# Patient Record
Sex: MALE | Race: BLACK OR AFRICAN AMERICAN | NOT HISPANIC OR LATINO | Employment: PART TIME | URBAN - METROPOLITAN AREA
[De-identification: names, ages, dates, MRNs, and addresses within clinical notes are randomized per-mention and may not be internally consistent; named-entity substitution may affect disease eponyms.]

---

## 2021-05-25 ENCOUNTER — RECORDS - HEALTHEAST (OUTPATIENT)
Dept: ADMINISTRATIVE | Facility: CLINIC | Age: 56
End: 2021-05-25

## 2021-05-28 ENCOUNTER — RECORDS - HEALTHEAST (OUTPATIENT)
Dept: ADMINISTRATIVE | Facility: CLINIC | Age: 56
End: 2021-05-28

## 2023-05-30 ENCOUNTER — MEDICAL CORRESPONDENCE (OUTPATIENT)
Dept: HEALTH INFORMATION MANAGEMENT | Facility: CLINIC | Age: 58
End: 2023-05-30
Payer: COMMERCIAL

## 2023-05-31 ENCOUNTER — TRANSCRIBE ORDERS (OUTPATIENT)
Dept: OTHER | Age: 58
End: 2023-05-31

## 2023-05-31 DIAGNOSIS — R97.20 ELEVATED PSA: Primary | ICD-10-CM

## 2023-06-01 NOTE — TELEPHONE ENCOUNTER
MEDICAL RECORDS REQUEST   [facility]  Urology Clinic  [address] [place] [id number]  PHONE: [phone]  Fax: [phone]        FUTURE VISIT INFORMATION                                                       APPOINTMENT INFORMATION:    Date: 07/28/2023    Provider:  Nadir Holloway PA-C    Reason for Visit/Diagnosis: Elevated PSA    REFERRAL INFORMATION:    Referring provider:  Dr. Carri Hawkins at [facility] contact number: Ref phone: [phone] Ref fax: [phone]    RECORDS REQUESTED FOR VISIT                                                     NOTES  STATUS/DETAILS   OFFICE NOTE from referring provider  yes   OFFICE NOTE from other specialist  yes   MEDICATION LIST  yes   LABS     URINALYSIS (UA)  yes   PSA  yes     PRE-VISIT CHECKLIST      Joint diagnostic appointment coordinated correctly          (ensure right order & amount of time) Yes   RECORD COLLECTION COMPLETE Yes

## 2023-07-20 ENCOUNTER — PRE VISIT (OUTPATIENT)
Dept: UROLOGY | Facility: CLINIC | Age: 58
End: 2023-07-20
Payer: COMMERCIAL

## 2023-07-20 NOTE — TELEPHONE ENCOUNTER
Reason for visit: Consult    Relevant information: Elevated PSA referral     Records/imaging/labs/orders: Epic    At Rooming: carlos a Cárdenas  7/20/2023  9:26 AM

## 2023-07-20 NOTE — PROGRESS NOTES
Subjective     REFERRAL SOURCE  The patient is being seen in consultation at the request of [facility]  [facility]  [address] [place] [address]    REASON FOR CONSULT  Elevated PSA    HISTORY OF PRESENT ILLNESS  Mr. Donald is a pleasant 58 year old male who presents today for further evaluation and management of his elevated PSA. His past medical history is significant for GERD, thrombocytopenia, and chronic abdominal pain. I personally reviewed the primary care note from 5/25/23 in preparation for this visit.     Today:    Dysuria occasionally with certain foods and drinks    Baseline urinary symptoms of frequency     No pain with erections    No history of UTIs or prostatits     REVIEW OF SYSTEMS  Review of Systems   Constitutional: Negative for activity change and unexpected weight change.   HENT: Negative for ear pain and tinnitus.    Eyes: Negative for visual disturbance.   Respiratory: Negative for shortness of breath.    Cardiovascular: Negative for chest pain.   Gastrointestinal: Positive for constipation (Occasional). Negative for abdominal pain.   Genitourinary: Negative for hematuria.   Musculoskeletal: Negative for back pain.   Neurological: Negative for dizziness (Intermittent, not now) and light-headedness.   Hematological: Negative for adenopathy.   Psychiatric/Behavioral: Negative for sleep disturbance.     SOCIAL HISTORY  Denies any history of or current smoking     FAMILY HISTORY  Denies any known family history of urologic malignancy      RELEVANT SURGICAL HISTORY  Denies any history of abdominal surgeries     Objective     PHYSICAL EXAMINATION  General: Well-appearing male in no acute apparent distress.  Neuro: Alert and orientated x3.  Eyes: Anictric  Head: Normocephalic  Psych: Maintained eye contact throughout conversation.  Skin: No lesions in the areas examined.  Lungs: Normal respiratory effort upon inspiration.  Musculoskeletal: Normal gait noted with  ambulation.  Abdomen: Appears nondistended.  Rectal: Deferred, see plan    LABS  PSA Values:    5/11/23: 9.5 ng/mL  1/14/22: 6.41 ng/mL  7/9/19: 3.22 ng/mL    IMPRESSION  1. Elevated PSA    It was my pleasure meeting Mr. Donald in clinic today for discussion of his elevated PSA. We first discussed the etiologies of elevated PSA, including infection, inflammation, ejaculation prior to sampling, BPH, recent perineal trauma or catheterization, versus malignancy. We then discussed the natural history of prostate cancer and how it shapes our prostate cancer screening. Finally, we reviewed Mr. Donald current and previous PSA's and discussed that they have been increasing in a worrisome way that could repersent a prostate cancer. In light of this, we discussed that I believe now is the time to get a better piece of information in the form of the prostate MRI. We reviewed that while a repeat PSA or standard 12 core biopsy are both options, the MRI is a luda middle step that may spare him a biopsy or help ultrasound pursue a targeted biopsy.     After a detailed discussion during which we reviewed the risks and benefits of the above options, Mr. Donald elects to proceed with first available prostate MRI. Multiple questions were answered to his satisfaction. A business card was provided to the patient, and if he has any additional questions or concerns he is welcome to contact me.    PLAN    First available prostate MRI with office visit to discuss results     Signed by:    Nadir Holloway PA-C    I spent a total of 22 minutes spent on the date of the encounter doing chart review, history and exam, documentation, and further activities as noted above.

## 2023-07-21 ENCOUNTER — OFFICE VISIT (OUTPATIENT)
Dept: UROLOGY | Facility: CLINIC | Age: 58
End: 2023-07-21
Payer: COMMERCIAL

## 2023-07-21 VITALS — HEART RATE: 93 BPM | DIASTOLIC BLOOD PRESSURE: 81 MMHG | SYSTOLIC BLOOD PRESSURE: 131 MMHG

## 2023-07-21 DIAGNOSIS — R97.20 ELEVATED PROSTATE SPECIFIC ANTIGEN (PSA): Primary | ICD-10-CM

## 2023-07-21 PROCEDURE — 99204 OFFICE O/P NEW MOD 45 MIN: CPT | Performed by: STUDENT IN AN ORGANIZED HEALTH CARE EDUCATION/TRAINING PROGRAM

## 2023-07-21 RX ORDER — ERGOCALCIFEROL 1.25 MG/1
50000 CAPSULE ORAL
COMMUNITY
Start: 2022-01-17

## 2023-07-21 ASSESSMENT — ENCOUNTER SYMPTOMS
UNEXPECTED WEIGHT CHANGE: 0
ABDOMINAL PAIN: 0
BACK PAIN: 0
SLEEP DISTURBANCE: 0
HEMATURIA: 0
SHORTNESS OF BREATH: 0
CONSTIPATION: 1
ACTIVITY CHANGE: 0
DIZZINESS: 0
LIGHT-HEADEDNESS: 0
ADENOPATHY: 0

## 2023-07-21 ASSESSMENT — PAIN SCALES - GENERAL: PAINLEVEL: NO PAIN (0)

## 2023-07-21 NOTE — LETTER
7/21/2023       RE: Morris Donald  [address]  [address]  [place] [address]     Dear Colleague,    Thank you for referring your patient, Morris Donald, to the [facility]OLOGY [facility] [place] [facility]. Please see a copy of my visit note below.    Subjective     REFERRAL SOURCE  The patient is being seen in consultation at the request of Carri Hawkins  [facility]  [address] [place] [address]    REASON FOR CONSULT  Elevated PSA    HISTORY OF PRESENT ILLNESS  Mr. Donald is a pleasant 58 year old male who presents today for further evaluation and management of his elevated PSA. His past medical history is significant for GERD, thrombocytopenia, and chronic abdominal pain. I personally reviewed the primary care note from 5/25/23 in preparation for this visit.     Today:  Dysuria occasionally with certain foods and drinks  Baseline urinary symptoms of frequency   No pain with erections  No history of UTIs or prostatits     REVIEW OF SYSTEMS  Review of Systems   Constitutional: Negative for activity change and unexpected weight change.   HENT: Negative for ear pain and tinnitus.    Eyes: Negative for visual disturbance.   Respiratory: Negative for shortness of breath.    Cardiovascular: Negative for chest pain.   Gastrointestinal: Positive for constipation (Occasional). Negative for abdominal pain.   Genitourinary: Negative for hematuria.   Musculoskeletal: Negative for back pain.   Neurological: Negative for dizziness (Intermittent, not now) and light-headedness.   Hematological: Negative for adenopathy.   Psychiatric/Behavioral: Negative for sleep disturbance.     SOCIAL HISTORY  Denies any history of or current smoking     FAMILY HISTORY  Denies any known family history of urologic malignancy      RELEVANT SURGICAL HISTORY  Denies any history of abdominal surgeries     Objective     PHYSICAL  EXAMINATION  General: Well-appearing male in no acute apparent distress.  Neuro: Alert and orientated x3.  Eyes: Anictric  Head: Normocephalic  Psych: Maintained eye contact throughout conversation.  Skin: No lesions in the areas examined.  Lungs: Normal respiratory effort upon inspiration.  Musculoskeletal: Normal gait noted with ambulation.  Abdomen: Appears nondistended.  Rectal: Deferred, see plan    LABS  PSA Values:    5/11/23: 9.5 ng/mL  1/14/22: 6.41 ng/mL  7/9/19: 3.22 ng/mL    IMPRESSION  Elevated PSA    It was my pleasure meeting Mr. Donald in clinic today for discussion of his elevated PSA. We first discussed the etiologies of elevated PSA, including infection, inflammation, ejaculation prior to sampling, BPH, recent perineal trauma or catheterization, versus malignancy. We then discussed the natural history of prostate cancer and how it shapes our prostate cancer screening. Finally, we reviewed Mr. Donald current and previous PSA's and discussed that they have been increasing in a worrisome way that could repersent a prostate cancer. In light of this, we discussed that I believe now is the time to get a better piece of information in the form of the prostate MRI. We reviewed that while a repeat PSA or standard 12 core biopsy are both options, the MRI is a luda middle step that may spare him a biopsy or help ultrasound pursue a targeted biopsy.     After a detailed discussion during which we reviewed the risks and benefits of the above options, Mr. Donald elects to proceed with first available prostate MRI. Multiple questions were answered to his satisfaction. A business card was provided to the patient, and if he has any additional questions or concerns he is welcome to contact me.    PLAN  First available prostate MRI with office visit to discuss results     Signed by:    Nadir Holloway PA-C    I spent a total of 22 minutes spent on the date of the encounter doing chart review, history and exam,  documentation, and further activities as noted above.

## 2023-07-21 NOTE — NURSING NOTE
Chief Complaint   Patient presents with     Consult     Elevated PSA and urinary urgency        Blood pressure 131/81, pulse 93. There is no height or weight on file to calculate BMI.    There is no problem list on file for this patient.      No Known Allergies    Current Outpatient Medications   Medication Sig Dispense Refill     ergocalciferol (ERGOCALCIFEROL) 1.25 MG (23464 UT) capsule Take 50,000 Units by mouth       omeprazole (PRILOSEC) 20 MG DR capsule Take 20 mg by mouth         Social History     Tobacco Use     Smoking status: Never     Smokeless tobacco: Never       Reba Mendiola, [facility]  7/21/2023  1:23 PM

## 2023-07-28 ENCOUNTER — PRE VISIT (OUTPATIENT)
Dept: UROLOGY | Facility: CLINIC | Age: 58
End: 2023-07-28

## 2023-08-27 ENCOUNTER — ANCILLARY PROCEDURE (OUTPATIENT)
Dept: MRI IMAGING | Facility: CLINIC | Age: 58
End: 2023-08-27
Attending: STUDENT IN AN ORGANIZED HEALTH CARE EDUCATION/TRAINING PROGRAM
Payer: COMMERCIAL

## 2023-08-27 DIAGNOSIS — R97.20 ELEVATED PROSTATE SPECIFIC ANTIGEN (PSA): ICD-10-CM

## 2023-08-27 PROCEDURE — 72197 MRI PELVIS W/O & W/DYE: CPT | Performed by: RADIOLOGY

## 2023-08-27 PROCEDURE — A9585 GADOBUTROL INJECTION: HCPCS | Performed by: RADIOLOGY

## 2023-08-27 RX ORDER — GADOBUTROL 604.72 MG/ML
7.5 INJECTION INTRAVENOUS ONCE
Status: COMPLETED | OUTPATIENT
Start: 2023-08-27 | End: 2023-08-27

## 2023-08-27 RX ADMIN — GADOBUTROL 7.5 ML: 604.72 INJECTION INTRAVENOUS at 13:10

## 2023-08-27 NOTE — DISCHARGE INSTRUCTIONS

## 2023-09-08 ENCOUNTER — TELEPHONE (OUTPATIENT)
Dept: UROLOGY | Facility: CLINIC | Age: 58
End: 2023-09-08

## 2023-09-08 ENCOUNTER — PRE VISIT (OUTPATIENT)
Dept: UROLOGY | Facility: CLINIC | Age: 58
End: 2023-09-08

## 2023-09-08 NOTE — TELEPHONE ENCOUNTER
Reason for visit: Return: Elevated PSA     Relevant information: LOV: 7;21/23  POC:First available prostate MRI with office visit to discuss results      Records/imaging/labs/orders: 8/27/23 MR Prostate - Available in Epic    Pt called: No need for a call    At Rooming: no special needs    Deann Spicer LPN  9/8/2023  8:13 AM

## 2023-09-22 ENCOUNTER — PRE VISIT (OUTPATIENT)
Dept: UROLOGY | Facility: CLINIC | Age: 58
End: 2023-09-22
Payer: COMMERCIAL

## 2023-09-22 NOTE — TELEPHONE ENCOUNTER
Reason for visit: Follow up      Relevant information: Test results     Records/imaging/labs/orders: MRI in [facility]     At Rooming: Blair Cárdenas  9/22/2023  9:50 AM

## 2023-09-28 NOTE — PROGRESS NOTES
Subjective   REASON FOR VISIT  Elevated PSA follow-up     HISTORY OF PRESENT ILLNESS  Mr. Donald is a pleasant 58 year old male who I am speaking with today in follow-up for his elevated PSA.  I first met him on 7/21/2023, at which time we discussed that his PSA trend was somewhat worrisome, and ultimately we recommended first available prostate MRI.  This was completed on 8/27/2023 and he returns today to discuss results.     Objective   PHYSICAL EXAMINATION  General: Alert, oriented, no acute distress     LABS  PSA Values:     5/11/23: 9.5 ng/mL  1/14/22: 6.41 ng/mL  7/9/19: 3.22 ng/mL     IMAGING  I personally reviewed the below prostate MRI from 8/27/2023     Narrative & Impression   MRI PROSTATE: 8/27/2023 2:08 PM     CLINICAL HISTORY: Elevated prostate specific antigen (PSA)     Most Recent PSA: 9.5 ug/L (5/11/23)     Comparison: None.     TECHNIQUE:  The following sequences were obtained: High-resolution axial  T2-weighted, coronal T2-weighted, 3D volumetric T2-weighted, axial  pre-contrast T1, axial diffusion-weighted, axial apparent diffusion  coefficient and axial dynamic contrast-enhanced T1. Postcontrast  images were evaluated on a separate workstation to evaluate dynamic  contrast enhancement. The technique of this exam is PI-RADS v2.1  compliant. Contrast dose: 7.5mL Gadavist     FINDINGS:  Size: 45 grams  Hemorrhage: Absent  Peripheral zone: Heterogeneous on T2-weighted images. Regions of  mildly decreased signal on ADC or DWI which are best characterized as  PI-RADS 2 without highly suspicious lesion.  Transition zone: Enlarged with BPH changes. Suspicious lesions as  detailed below.     Lesion(s) in rank order of severity (highest score- to lowest score,  then by size)      Lesion 1:  Location: Right mid gland transition zone at the 8-9 o'clock position  relative to the urethra. Series 7 image 40.   Additional prostate regions involved: None  Size: 12 mm  T2 description: Partially encapsulated  moderately T2 hypointense  nodule  T2 numerical assessment: 2  DWI description: Moderately hyperintense on DWI and hypointense on  ADC.  DWI numerical assessment: 4  DCE assessment: Positive    Prostate margin: Capsular abutment<6 mm  Lesion overall PI-RADS category: 3     Lesion 2:  Location: Right apex transition zone at the 9-10 o'clock position  relative to the urethra. Series 7 image 62.   Additional prostate regions involved: None  Size: 9 mm  T2 description: Heterogenous signal intensity with ill-defined  margins.  T2 numerical assessment: 3  DWI description: Moderately hyperintense on DWI and hypointense on ADC  DWI numerical assessment: 4  DCE assessment: Positive    Prostate margin: No capsular abutment  Lesion overall PI-RADS category: 3     Lesion 3:  Location: Left mid gland transition zone at the 4 o'clock position  relative to the urethra. Series 7 image 46.   Additional prostate regions involved: None  Size: 8 mm  T2 description: Heterogenous signal intensity with obscured margins  T2 numerical assessment: 3  DWI description: Moderately hypointensity on ADC and hyperintensity on  DWI   DWI numerical assessment: 4  DCE assessment: Positive    Prostate margin: No capsular abutment  Lesion overall PI-RADS category: 3     Neurovascular bundles: No neurovascular bundle involvement by  malignancy.  Seminal vesicles: No seminal vesicle involvement by malignancy.  Lymph nodes: No lymph node involvement. Multiple prominent bilateral  inguinal lymph nodes. Subcentimeter prominent right external iliac  chain lymph node, which is indeterminate (series 20 image 47).  Bones: No suspicious lesions  Other pelvic organs: Diffuse bladder wall thickening with  trabeculations, likely related to chronic bladder outlet obstruction.  Trace right fat-containing inguinal hernia.                                                                         IMPRESSION:  1. Based on the most suspicious abnormality, this exam  is  characterized as PIRADS 3 - The presence of clinically significant  cancer is equivocal.  The most suspicious abnormality is located at  the right mid gland, right apex and left mid gland transition zones  and there is minimal capsular abutment with no convincing evidence of  extraprostatic extension.  2. No suspicious adenopathy or evidence of pelvic metastases.        PIRADS? v2.1 Assessment Categories   PIRADS 1: Very low (clinically significant cancer is highly unlikely  to be present)   PIRADS 2: Low (clinically significant cancer is unlikely to be  present)   PIRADS 3: Intermediate (the presence of clinically significant cancer  is equivocal)   PIRADS 4: High (clinically significant cancer is likely to be present)     PIRADS 5: Very high (clinically significant cancer is highly likely to  be present)     I have personally reviewed the examination and initial interpretation  and I agree with the findings.     TELMA THURMAN MD       Assessment   Elevated PSA with three PI-RADS 3 lesions on prostate MRI     It was my pleasure to meet with Mr. Donald in follow-up for his elevated PSA.  After reviewing his clinical situation as well as the different possible results the prostate MRI could show us, I informed him that his MRI showed 3 separate PI-RADS 3 lesions in separate areas of the prostate.  This paired with his elevating PSA leads us to strongly recommend he go forward with a first available prostate biopsy.  We walked through this procedure in detail, including the risks and benefits, and also discussed the prework he would have to do including an antibiotic course of 3 days that he would start the day before the biopsy and an enema that he would complete 2 hours prior to his biopsy.  After reviewing this information, I expressed understanding agreement moving forward with this biopsy and week with the surgeon 1 week afterwards to discuss results.  Of note, he stated that he understood the majority  of our conversation very well but is requesting Mr. Donald he have an  be used when he is called in regards to the antibiotic course.    Mr. Donald expressed understanding and agreement to the above discussion and plan and all of his questions were answered to his satisfaction.       PLAN  First available prostate biopsy with office visit with same surgeon one week later to go through results     SIGNED:     Nadir Holloway PA-C

## 2023-09-29 ENCOUNTER — OFFICE VISIT (OUTPATIENT)
Dept: UROLOGY | Facility: CLINIC | Age: 58
End: 2023-09-29
Payer: COMMERCIAL

## 2023-09-29 VITALS
HEART RATE: 67 BPM | DIASTOLIC BLOOD PRESSURE: 82 MMHG | HEIGHT: 78 IN | SYSTOLIC BLOOD PRESSURE: 149 MMHG | WEIGHT: 136 LBS | BODY MASS INDEX: 15.74 KG/M2

## 2023-09-29 DIAGNOSIS — R97.20 ELEVATED PROSTATE SPECIFIC ANTIGEN (PSA): Primary | ICD-10-CM

## 2023-09-29 PROCEDURE — 99213 OFFICE O/P EST LOW 20 MIN: CPT | Performed by: STUDENT IN AN ORGANIZED HEALTH CARE EDUCATION/TRAINING PROGRAM

## 2023-09-29 ASSESSMENT — PAIN SCALES - GENERAL: PAINLEVEL: NO PAIN (0)

## 2023-09-29 NOTE — NURSING NOTE
"Chief Complaint   Patient presents with    Follow Up     MRI follow up       Blood pressure (!) 149/82, pulse 67, height 1.999 m (6' 6.7\"), weight 61.7 kg (136 lb). Body mass index is 15.44 kg/m .    There is no problem list on file for this patient.      No Known Allergies    Current Outpatient Medications   Medication Sig Dispense Refill    ergocalciferol (ERGOCALCIFEROL) 1.25 MG (70807 UT) capsule Take 50,000 Units by mouth      omeprazole (PRILOSEC) 20 MG DR capsule Take 20 mg by mouth         Social History     Tobacco Use    Smoking status: Never    Smokeless tobacco: Never       Shankar Smith MA  9/29/2023  1:49 PM     "

## 2023-09-29 NOTE — LETTER
9/29/2023       RE: Morris Donald  [address]  [address]  [place] [address]     Dear Colleague,    Thank you for referring your patient, Morris Donald, to the [facility]OLOG[facility] [place] at [facility]. Please see a copy of my visit note below.    Subjective  REASON FOR VISIT  Elevated PSA follow-up     HISTORY OF PRESENT ILLNESS  Mr. Donald is a pleasant 58 year old male who I am speaking with today in follow-up for his elevated PSA.  I first met him on 7/21/2023, at which time we discussed that his PSA trend was somewhat worrisome, and ultimately we recommended first available prostate MRI.  This was completed on 8/27/2023 and he returns today to discuss results.     Objective  PHYSICAL EXAMINATION  General: Alert, oriented, no acute distress     LABS  PSA Values:     5/11/23: 9.5 ng/mL  1/14/22: 6.41 ng/mL  7/9/19: 3.22 ng/mL     IMAGING  I personally reviewed the below prostate MRI from 8/27/2023     Narrative & Impression   MRI PROSTATE: 8/27/2023 2:08 PM     CLINICAL HISTORY: Elevated prostate specific antigen (PSA)     Most Recent PSA: 9.5 ug/L (5/11/23)     Comparison: None.     TECHNIQUE:  The following sequences were obtained: High-resolution axial  T2-weighted, coronal T2-weighted, 3D volumetric T2-weighted, axial  pre-contrast T1, axial diffusion-weighted, axial apparent diffusion  coefficient and axial dynamic contrast-enhanced T1. Postcontrast  images were evaluated on a separate workstation to evaluate dynamic  contrast enhancement. The technique of this exam is PI-RADS v2.1  compliant. Contrast dose: 7.5mL Gadavist     FINDINGS:  Size: 45 grams  Hemorrhage: Absent  Peripheral zone: Heterogeneous on T2-weighted images. Regions of  mildly decreased signal on ADC or DWI which are best characterized as  PI-RADS 2 without highly suspicious lesion.  Transition zone: Enlarged with BPH changes. Suspicious lesions  as  detailed below.     Lesion(s) in rank order of severity (highest score- to lowest score,  then by size)      Lesion 1:  Location: Right mid gland transition zone at the 8-9 o'clock position  relative to the urethra. Series 7 image 40.   Additional prostate regions involved: None  Size: 12 mm  T2 description: Partially encapsulated moderately T2 hypointense  nodule  T2 numerical assessment: 2  DWI description: Moderately hyperintense on DWI and hypointense on  ADC.  DWI numerical assessment: 4  DCE assessment: Positive    Prostate margin: Capsular abutment<6 mm  Lesion overall PI-RADS category: 3     Lesion 2:  Location: Right apex transition zone at the 9-10 o'clock position  relative to the urethra. Series 7 image 62.   Additional prostate regions involved: None  Size: 9 mm  T2 description: Heterogenous signal intensity with ill-defined  margins.  T2 numerical assessment: 3  DWI description: Moderately hyperintense on DWI and hypointense on ADC  DWI numerical assessment: 4  DCE assessment: Positive    Prostate margin: No capsular abutment  Lesion overall PI-RADS category: 3     Lesion 3:  Location: Left mid gland transition zone at the 4 o'clock position  relative to the urethra. Series 7 image 46.   Additional prostate regions involved: None  Size: 8 mm  T2 description: Heterogenous signal intensity with obscured margins  T2 numerical assessment: 3  DWI description: Moderately hypointensity on ADC and hyperintensity on  DWI   DWI numerical assessment: 4  DCE assessment: Positive    Prostate margin: No capsular abutment  Lesion overall PI-RADS category: 3     Neurovascular bundles: No neurovascular bundle involvement by  malignancy.  Seminal vesicles: No seminal vesicle involvement by malignancy.  Lymph nodes: No lymph node involvement. Multiple prominent bilateral  inguinal lymph nodes. Subcentimeter prominent right external iliac  chain lymph node, which is indeterminate (series 20 image 47).  Bones: No  suspicious lesions  Other pelvic organs: Diffuse bladder wall thickening with  trabeculations, likely related to chronic bladder outlet obstruction.  Trace right fat-containing inguinal hernia.                                                                         IMPRESSION:  1. Based on the most suspicious abnormality, this exam is  characterized as PIRADS 3 - The presence of clinically significant  cancer is equivocal.  The most suspicious abnormality is located at  the right mid gland, right apex and left mid gland transition zones  and there is minimal capsular abutment with no convincing evidence of  extraprostatic extension.  2. No suspicious adenopathy or evidence of pelvic metastases.        PIRADS? v2.1 Assessment Categories   PIRADS 1: Very low (clinically significant cancer is highly unlikely  to be present)   PIRADS 2: Low (clinically significant cancer is unlikely to be  present)   PIRADS 3: Intermediate (the presence of clinically significant cancer  is equivocal)   PIRADS 4: High (clinically significant cancer is likely to be present)     PIRADS 5: Very high (clinically significant cancer is highly likely to  be present)     I have personally reviewed the examination and initial interpretation  and I agree with the findings.     TELMA THURMAN MD       Assessment  Elevated PSA with three PI-RADS 3 lesions on prostate MRI     It was my pleasure to meet with Mr. Donald in follow-up for his elevated PSA.  After reviewing his clinical situation as well as the different possible results the prostate MRI could show us, I informed him that his MRI showed 3 separate PI-RADS 3 lesions in separate areas of the prostate.  This paired with his elevating PSA leads us to strongly recommend he go forward with a first available prostate biopsy.  We walked through this procedure in detail, including the risks and benefits, and also discussed the prework he would have to do including an antibiotic course of 3  days that he would start the day before the biopsy and an enema that he would complete 2 hours prior to his biopsy.  After reviewing this information, I expressed understanding agreement moving forward with this biopsy and week with the surgeon 1 week afterwards to discuss results.  Of note, he stated that he understood the majority of our conversation very well but is requesting Mr. Donald he have an  be used when he is called in regards to the antibiotic course.    Mr. Donald expressed understanding and agreement to the above discussion and plan and all of his questions were answered to his satisfaction.       PLAN  First available prostate biopsy with office visit with same surgeon one week later to go through results     SIGNED:     Nadir Holloway PA-C

## 2023-12-12 ENCOUNTER — PRE VISIT (OUTPATIENT)
Dept: UROLOGY | Facility: CLINIC | Age: 58
End: 2023-12-12
Payer: COMMERCIAL

## 2023-12-12 DIAGNOSIS — R97.20 ELEVATED PROSTATE SPECIFIC ANTIGEN (PSA): Primary | ICD-10-CM

## 2023-12-12 RX ORDER — CIPROFLOXACIN 500 MG/1
500 TABLET, FILM COATED ORAL 2 TIMES DAILY
Qty: 6 TABLET | Refills: 0 | Status: SHIPPED | OUTPATIENT
Start: 2023-12-12 | End: 2023-12-15

## 2023-12-12 RX ORDER — GENTAMICIN 40 MG/ML
80 INJECTION, SOLUTION INTRAMUSCULAR; INTRAVENOUS ONCE
Status: ACTIVE | OUTPATIENT
Start: 2023-12-20

## 2023-12-12 NOTE — CONFIDENTIAL NOTE
Reason for visit: fusion bx     Relevant information: elevated psa, pirads 3, 3 lesions    Records/imaging/labs/orders: in epic    At Rooming: verify procedure prep, GIVE gent    LVM with  to review [employer] message regarding bx prep and to call clinic back with any questions      Called patient to go over prep for biopsy including:    No blood thinning medications for 7 days before procedure, including aspirin, anticoagulants, ibuprofen and fish oil.     prophylactic antibiotics sent to primary pharmacy listed in chart:   -take the day before, the day of and the day after the procedure, one tablet, two times daily.    Complete a Fleets enema approximately 2 hours before the scheduled procedure.    Do not come to the appointment fasted.     Maritza Lawson  12/12/2023  10:56 AM